# Patient Record
Sex: MALE | Race: OTHER
[De-identification: names, ages, dates, MRNs, and addresses within clinical notes are randomized per-mention and may not be internally consistent; named-entity substitution may affect disease eponyms.]

---

## 2019-08-07 LAB
ANION GAP SERPL CALC-SCNC: 11.6 MMOL/L (ref 8–16)
BASOPHILS # BLD AUTO: 0.1 10*3/UL (ref 0–0.1)
BASOPHILS NFR BLD AUTO: 1.2 % (ref 0–1)
BUN SERPL-MCNC: 13 MG/DL (ref 7–26)
BUN/CREAT SERPL: 14 (ref 6–25)
CALCIUM SERPL-MCNC: 9.5 MG/DL (ref 8.4–10.2)
CHLORIDE SERPL-SCNC: 108 MMOL/L (ref 98–107)
CO2 SERPL-SCNC: 27 MMOL/L (ref 22–29)
DEPRECATED NEUTROPHILS # BLD AUTO: 3 10*3/UL (ref 2.1–6.9)
EGFRCR SERPLBLD CKD-EPI 2021: > 60 ML/MIN (ref 60–?)
EOSINOPHIL # BLD AUTO: 0.5 10*3/UL (ref 0–0.4)
EOSINOPHIL NFR BLD AUTO: 8.7 % (ref 0–6)
ERYTHROCYTE [DISTWIDTH] IN CORD BLOOD: 12.8 % (ref 11.7–14.4)
GLUCOSE SERPLBLD-MCNC: 89 MG/DL (ref 74–118)
HCT VFR BLD AUTO: 42.2 % (ref 38.2–49.6)
HGB BLD-MCNC: 13.9 G/DL (ref 14–18)
LYMPHOCYTES # BLD: 1.6 10*3/UL (ref 1–3.2)
LYMPHOCYTES NFR BLD AUTO: 28.6 % (ref 18–39.1)
MCH RBC QN AUTO: 26.5 PG (ref 28–32)
MCHC RBC AUTO-ENTMCNC: 32.9 G/DL (ref 31–35)
MCV RBC AUTO: 80.4 FL (ref 81–99)
MONOCYTES # BLD AUTO: 0.5 10*3/UL (ref 0.2–0.8)
MONOCYTES NFR BLD AUTO: 8.3 % (ref 4.4–11.3)
NEUTS SEG NFR BLD AUTO: 53 % (ref 38.7–80)
PLATELET # BLD AUTO: 193 X10E3/UL (ref 140–360)
POTASSIUM SERPL-SCNC: 4.6 MMOL/L (ref 3.5–5.1)
RBC # BLD AUTO: 5.25 X10E6/UL (ref 4.3–5.7)
SODIUM SERPL-SCNC: 142 MMOL/L (ref 136–145)

## 2019-08-07 NOTE — DIAGNOSTIC IMAGING REPORT
EXAMINATION:  CHEST 2 VIEWS    



INDICATION: Pre-operative



COMPARISON: None

     

FINDINGS:



LINES/TUBES:None



LUNGS:The lungs are well-inflated. No focal consolidation or pulmonary edema.



PLEURA:No pleural effusion or pneumothorax.



MEDIASTINUM:The cardiomediastinal silhouette appears normal in size and shape.



BONES/SOFT TISSUES:No acute osseous injury. Degenerative changes of the

visualized spine.



ABDOMEN:No free air under the diaphragm.





IMPRESSION: 

No focal pneumonia or pulmonary edema.



Signed by: Nayana Saldivar MD on 8/7/2019 9:31 AM

## 2019-08-08 ENCOUNTER — HOSPITAL ENCOUNTER (OUTPATIENT)
Dept: HOSPITAL 88 - OR | Age: 49
Discharge: HOME | End: 2019-08-08
Attending: UROLOGY
Payer: COMMERCIAL

## 2019-08-08 VITALS — DIASTOLIC BLOOD PRESSURE: 78 MMHG | SYSTOLIC BLOOD PRESSURE: 110 MMHG

## 2019-08-08 DIAGNOSIS — D57.3: ICD-10-CM

## 2019-08-08 DIAGNOSIS — K62.89: ICD-10-CM

## 2019-08-08 DIAGNOSIS — K21.9: ICD-10-CM

## 2019-08-08 DIAGNOSIS — Z01.812: ICD-10-CM

## 2019-08-08 DIAGNOSIS — N42.31: ICD-10-CM

## 2019-08-08 DIAGNOSIS — Z01.810: ICD-10-CM

## 2019-08-08 DIAGNOSIS — Z01.811: ICD-10-CM

## 2019-08-08 DIAGNOSIS — C61: Primary | ICD-10-CM

## 2019-08-08 PROCEDURE — 36415 COLL VENOUS BLD VENIPUNCTURE: CPT

## 2019-08-08 PROCEDURE — 88305 TISSUE EXAM BY PATHOLOGIST: CPT

## 2019-08-08 PROCEDURE — 76872 US TRANSRECTAL: CPT

## 2019-08-08 PROCEDURE — 88342 IMHCHEM/IMCYTCHM 1ST ANTB: CPT

## 2019-08-08 PROCEDURE — 93005 ELECTROCARDIOGRAM TRACING: CPT

## 2019-08-08 PROCEDURE — 76998 US GUIDE INTRAOP: CPT

## 2019-08-08 PROCEDURE — 55700: CPT

## 2019-08-08 PROCEDURE — 80048 BASIC METABOLIC PNL TOTAL CA: CPT

## 2019-08-08 PROCEDURE — 85025 COMPLETE CBC W/AUTO DIFF WBC: CPT

## 2019-08-08 PROCEDURE — 71046 X-RAY EXAM CHEST 2 VIEWS: CPT

## 2019-08-08 NOTE — XMS REPORT
Patient Summary Document

                             Created on: 2019



REJI BACA

External Reference #: [id number]

: 1970

Sex: Male



Demographics







                          Address                   [address] [place] [address]

 

                          Home Phone                [phone]

 

                          Preferred Language        Unknown

 

                          Marital Status            Unknown

 

                          Hoahaoism Affiliation     Unknown

 

                          Race                      Unknown

 

                                        Additional Race(s)  

 

                          Ethnic Group              Unknown





Author







                          Author                    [facility]

 

                          Address                   Unknown

 

                          Phone                     Unavailable







Care Team Providers







                    Care Team Member Name    Role                Phone

 

                    MELISSA KENYON    Unavailable         Unavailable







Problems

This patient has no known problems.



Allergies, Adverse Reactions, Alerts

This patient has no known allergies or adverse reactions.



Medications

This patient has no known medications.



Results







           Test Description    Test Time    Test Comments    Text Results    Atomic Results    Result

 Comments

 

                CHEST 2 VIEWS    2019 09:30:00                                                             

                                             [facility]  
                     [address] [address]  
   Patient Name: REJI BACA                                   MR #: 
[id number]                     : 1970                                  
Age/Sex: 49/M  Acct #: [id number]                              Req #: [id number]
[id number]  [facility] Physician:                                                      
Ordered by: ELIER KENYON MD                            Report #: [id number]
       Location: OR                                      Room/Bed:              
      
___________________________________________________________________________________________________
   Procedure: [id number] DX/CHEST 2 VIEWS  Exam Date: 19                   
        Exam Time: 907                                              REPORT 
STATUS: Signed    EXAMINATION:  CHEST 2 VIEWS          INDICATION: Pre-operative
     COMPARISON: None           FINDINGS:      LINES/TUBES:None      LUNGS:The 
lungs are well-inflated. No focal consolidation or pulmonary edema.      
PLEURA:No pleural effusion or pneumothorax.      MEDIASTINUM:The 
cardiomediastinal silhouette appears normal in size and shape.      BONES/SOFT 
TISSUES:No acute osseous injury. Degenerative changes of the   visualized spine.
     ABDOMEN:No free air under the diaphragm.         IMPRESSION:    No focal 
pneumonia or pulmonary edema.      Signed by: Aracelis Saldivar MD on 2019 9:31 AM
       Dictated By: ARACELIS SALDIVAR MD  Electronically Signed By: ARACELIS SALDIVAR MD on 
19  Transcribed By: LIDIA on 19       COPY TO:   
ELIER KENYON MD

## 2019-08-08 NOTE — OPERATIVE REPORT
DATE OF PROCEDURE:  08/08/2019

 

SURGEON:  Aston Alvarez MD

 

PREOPERATIVE DIAGNOSES:  

1. Elevated PSA.

2. Abnormal digital rectal exam.

 

POSTOPERATIVE DIAGNOSES:  

1. Elevated PSA.

2. Abnormal digital rectal exam.

 

OPERATIVE PROCEDURE PERFORMED:  Transrectal ultrasound biopsy of the prostate.

 

ANESTHESIA:  MAC.

 

ESTIMATED BLOOD LOSS:  Minimal.

 

INDICATIONS:  Mr. Jose Enrique Bean is a 49-year-old black man with a recent elevation of

his PSA and rectal examination with moderate induration on the right side of the apex.

He now presents for potential diagnosis of this problem. 

 

PROCEDURE IN DETAIL:  The patient was brought in the operating room, placed in supine

position.  After administration of IV sedation, he was placed in the left lateral

decubitus position and prepped and draped in the usual fashion.  Rectal examination was

as previously described.  Transrectal ultrasonography of the prostate was performed.

This revealed no obvious hypoechoic lesions except for some mild irregularity on the

right side at the mid and apex of the gland.  There was also some calcifications noted

in the transition zone.  Twelve biopsies were taken in the standard fashion and labeled

appropriately.  These were sent to pathology for microscopic analysis.  There was

minimal bleeding noted at the conclusion of the procedure.  The patient was returned to

supine position and he was transferred to the bed and taken to the postanesthesia care

unit in good condition.  Of note, the needle and instrument count were correct at the

conclusion of the case. 

 

 

 

 

______________________________

Aston Alvarez MD

 

HLW/MODL

D:  08/08/2019 09:38:37

T:  08/08/2019 16:01:27

Job #:  [id number]